# Patient Record
Sex: MALE | URBAN - METROPOLITAN AREA
[De-identification: names, ages, dates, MRNs, and addresses within clinical notes are randomized per-mention and may not be internally consistent; named-entity substitution may affect disease eponyms.]

---

## 2020-04-30 ENCOUNTER — EMERGENCY (EMERGENCY)
Facility: HOSPITAL | Age: 45
LOS: 1 days | Discharge: HOME | End: 2020-04-30
Attending: EMERGENCY MEDICINE | Admitting: EMERGENCY MEDICINE
Payer: SELF-PAY

## 2020-04-30 VITALS
OXYGEN SATURATION: 97 % | TEMPERATURE: 98 F | WEIGHT: 149.91 LBS | HEART RATE: 125 BPM | HEIGHT: 65 IN | DIASTOLIC BLOOD PRESSURE: 95 MMHG | RESPIRATION RATE: 18 BRPM | SYSTOLIC BLOOD PRESSURE: 150 MMHG

## 2020-04-30 VITALS — HEART RATE: 104 BPM

## 2020-04-30 DIAGNOSIS — Z91.041 RADIOGRAPHIC DYE ALLERGY STATUS: ICD-10-CM

## 2020-04-30 DIAGNOSIS — Z88.8 ALLERGY STATUS TO OTHER DRUGS, MEDICAMENTS AND BIOLOGICAL SUBSTANCES: ICD-10-CM

## 2020-04-30 DIAGNOSIS — K92.2 GASTROINTESTINAL HEMORRHAGE, UNSPECIFIED: ICD-10-CM

## 2020-04-30 DIAGNOSIS — Z88.5 ALLERGY STATUS TO NARCOTIC AGENT: ICD-10-CM

## 2020-04-30 DIAGNOSIS — K50.919 CROHN'S DISEASE, UNSPECIFIED, WITH UNSPECIFIED COMPLICATIONS: ICD-10-CM

## 2020-04-30 DIAGNOSIS — K62.5 HEMORRHAGE OF ANUS AND RECTUM: ICD-10-CM

## 2020-04-30 PROCEDURE — 93005 ELECTROCARDIOGRAM TRACING: CPT

## 2020-04-30 PROCEDURE — 93010 ELECTROCARDIOGRAM REPORT: CPT

## 2020-04-30 PROCEDURE — 99284 EMERGENCY DEPT VISIT MOD MDM: CPT

## 2020-04-30 PROCEDURE — 99283 EMERGENCY DEPT VISIT LOW MDM: CPT

## 2020-04-30 RX ORDER — SODIUM CHLORIDE 9 MG/ML
1000 INJECTION INTRAMUSCULAR; INTRAVENOUS; SUBCUTANEOUS ONCE
Refills: 0 | Status: DISCONTINUED | OUTPATIENT
Start: 2020-04-30 | End: 2020-05-04

## 2020-04-30 NOTE — ED ADULT NURSE NOTE - NSIMPLEMENTINTERV_GEN_ALL_ED
Implemented All Universal Safety Interventions:  Deal to call system. Call bell, personal items and telephone within reach. Instruct patient to call for assistance. Room bathroom lighting operational. Non-slip footwear when patient is off stretcher. Physically safe environment: no spills, clutter or unnecessary equipment. Stretcher in lowest position, wheels locked, appropriate side rails in place.

## 2020-04-30 NOTE — ED PROVIDER NOTE - CLINICAL SUMMARY MEDICAL DECISION MAKING FREE TEXT BOX
Pt with s/s noted above.  Concern for GI bleed, ischemic bowel, sbo, pud, etc.  Pt tachycardic and aox3.  Pt from [place], w/o ID, allergic to NSAIDs, IV contrast and upon asking patient about marks on arms he became defensive and got up from stretcher, walked out of ED in NAD.  Concern for behavior related to drug seeking.

## 2020-04-30 NOTE — ED PROVIDER NOTE - GASTROINTESTINAL, MLM
Belly soft, diffusely tender, no rebound or guarding, multiple surgical scars and ventral hernia present, nl BS.

## 2020-04-30 NOTE — ED ADULT TRIAGE NOTE - CHIEF COMPLAINT QUOTE
43 y/o male came in to ED for evaluation of bloody diarrhea, PT reports hx of chron's disease. Pt also reports subjective fever and chills.

## 2020-04-30 NOTE — ED PROVIDER NOTE - OBJECTIVE STATEMENT
45 yo M with hx of Crohn's with surgical complications in remote past presenting with several days of bloody loose stool, diffuse abdominal pains and at times coffee ground emesis.  Associated symptoms are generalized weakness.  Pt denies hx of PUD, varices and has had no chest pain, sob, cough, fever, chills or urinary symptoms.

## 2020-04-30 NOTE — ED PROVIDER NOTE - MUSCULOSKELETAL, MLM
Spine appears normal, range of motion is not limited, no muscle or joint tenderness, multiple fresh bilateral UE pinpoint puncture marks along vein lines

## 2020-10-14 NOTE — ED ADULT NURSE NOTE - PAIN: BODY LOCATION
Mom called in stating that the patient has been having problems with his allergies and since dr Ramon Garcia is no longer here she wants to see if you are able to send in a rx for chewable clairtin tablets. abdominal pain

## 2021-01-28 NOTE — ED PROVIDER NOTE - CONSTITUTIONAL, MLM
normal... Well appearing, awake, alert, oriented to person, place, time/situation and in no apparent distress. Detail Level: Detailed Initiate Treatment: Apply thin layer of triamcinolone to the right buttock twice daily x 2 weeks/as needed itch. Do not use more than 2 weeks out of the month.

## 2022-03-14 NOTE — ED PROVIDER NOTE - CARE PLAN
Principal Discharge DX:	Crohn's disease with complication, unspecified gastrointestinal tract location  Secondary Diagnosis:	Gastrointestinal hemorrhage, unspecified gastrointestinal hemorrhage type
no